# Patient Record
Sex: FEMALE | Race: WHITE | NOT HISPANIC OR LATINO | Employment: FULL TIME | URBAN - METROPOLITAN AREA
[De-identification: names, ages, dates, MRNs, and addresses within clinical notes are randomized per-mention and may not be internally consistent; named-entity substitution may affect disease eponyms.]

---

## 2017-09-14 ENCOUNTER — RECORDS - HEALTHEAST (OUTPATIENT)
Dept: GENERAL RADIOLOGY | Facility: CLINIC | Age: 37
End: 2017-09-14

## 2017-09-14 ENCOUNTER — OFFICE VISIT - HEALTHEAST (OUTPATIENT)
Dept: INTERNAL MEDICINE | Facility: CLINIC | Age: 37
End: 2017-09-14

## 2017-09-14 ENCOUNTER — AMBULATORY - HEALTHEAST (OUTPATIENT)
Dept: INTERNAL MEDICINE | Facility: CLINIC | Age: 37
End: 2017-09-14

## 2017-09-14 DIAGNOSIS — N39.0 UTI (URINARY TRACT INFECTION): ICD-10-CM

## 2017-09-14 DIAGNOSIS — R39.9 URINARY SYMPTOM OR SIGN: ICD-10-CM

## 2017-09-14 DIAGNOSIS — R30.0 DYSURIA: ICD-10-CM

## 2017-09-14 DIAGNOSIS — M41.9 SCOLIOSIS, UNSPECIFIED: ICD-10-CM

## 2017-09-14 DIAGNOSIS — M41.9 SCOLIOSIS: ICD-10-CM

## 2017-09-18 ENCOUNTER — COMMUNICATION - HEALTHEAST (OUTPATIENT)
Dept: INTERNAL MEDICINE | Facility: CLINIC | Age: 37
End: 2017-09-18

## 2017-09-19 ENCOUNTER — AMBULATORY - HEALTHEAST (OUTPATIENT)
Dept: NEUROSURGERY | Facility: CLINIC | Age: 37
End: 2017-09-19

## 2017-09-19 DIAGNOSIS — M41.9 SCOLIOSIS: ICD-10-CM

## 2017-09-21 ENCOUNTER — HOSPITAL ENCOUNTER (OUTPATIENT)
Dept: RADIOLOGY | Facility: CLINIC | Age: 37
Discharge: HOME | End: 2017-09-21
Attending: NEUROLOGICAL SURGERY

## 2017-09-21 DIAGNOSIS — M41.9 SCOLIOSIS: ICD-10-CM

## 2017-10-17 ENCOUNTER — OFFICE VISIT - HEALTHEAST (OUTPATIENT)
Dept: NEUROSURGERY | Facility: CLINIC | Age: 37
End: 2017-10-17

## 2017-10-17 DIAGNOSIS — M41.125 ADOLESCENT IDIOPATHIC SCOLIOSIS OF THORACOLUMBAR REGION: ICD-10-CM

## 2017-10-17 DIAGNOSIS — M43.25 FUSION OF SPINE OF THORACOLUMBAR REGION: ICD-10-CM

## 2018-07-12 ENCOUNTER — OFFICE VISIT - HEALTHEAST (OUTPATIENT)
Dept: INTERNAL MEDICINE | Facility: CLINIC | Age: 38
End: 2018-07-12

## 2018-07-12 DIAGNOSIS — B07.0 PLANTAR WARTS: ICD-10-CM

## 2019-10-28 ENCOUNTER — COMMUNICATION - HEALTHEAST (OUTPATIENT)
Dept: HEALTH INFORMATION MANAGEMENT | Facility: CLINIC | Age: 39
End: 2019-10-28

## 2020-02-12 ENCOUNTER — COMMUNICATION - HEALTHEAST (OUTPATIENT)
Dept: FAMILY MEDICINE | Facility: CLINIC | Age: 40
End: 2020-02-12

## 2020-02-12 ENCOUNTER — OFFICE VISIT - HEALTHEAST (OUTPATIENT)
Dept: FAMILY MEDICINE | Facility: CLINIC | Age: 40
End: 2020-02-12

## 2020-02-12 DIAGNOSIS — R00.0 TACHYCARDIA: ICD-10-CM

## 2020-02-12 LAB
ATRIAL RATE - MUSE: 91 BPM
DIASTOLIC BLOOD PRESSURE - MUSE: NORMAL
INTERPRETATION ECG - MUSE: NORMAL
P AXIS - MUSE: 34 DEGREES
PR INTERVAL - MUSE: 110 MS
QRS DURATION - MUSE: 84 MS
QT - MUSE: 350 MS
QTC - MUSE: 430 MS
R AXIS - MUSE: 38 DEGREES
SYSTOLIC BLOOD PRESSURE - MUSE: NORMAL
T AXIS - MUSE: 35 DEGREES
VENTRICULAR RATE- MUSE: 91 BPM

## 2020-02-16 ENCOUNTER — AMBULATORY - HEALTHEAST (OUTPATIENT)
Dept: FAMILY MEDICINE | Facility: CLINIC | Age: 40
End: 2020-02-16

## 2020-02-16 DIAGNOSIS — R00.0 TACHYCARDIA: ICD-10-CM

## 2021-04-28 ENCOUNTER — OFFICE VISIT - HEALTHEAST (OUTPATIENT)
Dept: INTERNAL MEDICINE | Facility: CLINIC | Age: 41
End: 2021-04-28

## 2021-04-28 DIAGNOSIS — R00.0 TACHYCARDIA: ICD-10-CM

## 2021-04-28 DIAGNOSIS — E04.9 THYROID ENLARGED: ICD-10-CM

## 2021-04-28 DIAGNOSIS — M43.25 FUSION OF SPINE OF THORACOLUMBAR REGION: ICD-10-CM

## 2021-04-28 DIAGNOSIS — Z00.00 HEALTHCARE MAINTENANCE: ICD-10-CM

## 2021-04-28 DIAGNOSIS — M41.125 ADOLESCENT IDIOPATHIC SCOLIOSIS OF THORACOLUMBAR REGION: ICD-10-CM

## 2021-04-28 LAB
ALBUMIN SERPL-MCNC: 4.8 G/DL (ref 3.5–5)
ALBUMIN UR-MCNC: NEGATIVE G/DL
ALP SERPL-CCNC: 82 U/L (ref 45–120)
ALT SERPL W P-5'-P-CCNC: 12 U/L (ref 0–45)
ANION GAP SERPL CALCULATED.3IONS-SCNC: 15 MMOL/L (ref 5–18)
APPEARANCE UR: CLEAR
AST SERPL W P-5'-P-CCNC: 18 U/L (ref 0–40)
BACTERIA #/AREA URNS HPF: ABNORMAL /[HPF]
BILIRUB SERPL-MCNC: 0.8 MG/DL (ref 0–1)
BILIRUB UR QL STRIP: NEGATIVE
BUN SERPL-MCNC: 6 MG/DL (ref 8–22)
CALCIUM SERPL-MCNC: 9.6 MG/DL (ref 8.5–10.5)
CHLORIDE BLD-SCNC: 103 MMOL/L (ref 98–107)
CHOLEST SERPL-MCNC: 195 MG/DL
CO2 SERPL-SCNC: 22 MMOL/L (ref 22–31)
COLOR UR AUTO: YELLOW
CREAT SERPL-MCNC: 0.71 MG/DL (ref 0.6–1.1)
ERYTHROCYTE [DISTWIDTH] IN BLOOD BY AUTOMATED COUNT: 13 % (ref 11–14.5)
FASTING STATUS PATIENT QL REPORTED: YES
GFR SERPL CREATININE-BSD FRML MDRD: >60 ML/MIN/1.73M2
GLUCOSE BLD-MCNC: 82 MG/DL (ref 70–125)
GLUCOSE UR STRIP-MCNC: NEGATIVE MG/DL
HCT VFR BLD AUTO: 41.7 % (ref 35–47)
HDLC SERPL-MCNC: 79 MG/DL
HGB BLD-MCNC: 13.7 G/DL (ref 12–16)
HGB UR QL STRIP: ABNORMAL
KETONES UR STRIP-MCNC: ABNORMAL MG/DL
LDLC SERPL CALC-MCNC: 106 MG/DL
LEUKOCYTE ESTERASE UR QL STRIP: ABNORMAL
MCH RBC QN AUTO: 28.8 PG (ref 27–34)
MCHC RBC AUTO-ENTMCNC: 32.9 G/DL (ref 32–36)
MCV RBC AUTO: 88 FL (ref 80–100)
NITRATE UR QL: NEGATIVE
PH UR STRIP: 5 [PH] (ref 5–8)
PLATELET # BLD AUTO: 346 THOU/UL (ref 140–440)
PMV BLD AUTO: 9.8 FL (ref 7–10)
POTASSIUM BLD-SCNC: 3.8 MMOL/L (ref 3.5–5)
PROT SERPL-MCNC: 8.1 G/DL (ref 6–8)
RBC # BLD AUTO: 4.75 MILL/UL (ref 3.8–5.4)
RBC #/AREA URNS AUTO: ABNORMAL HPF
SODIUM SERPL-SCNC: 140 MMOL/L (ref 136–145)
SP GR UR STRIP: <=1.005 (ref 1–1.03)
SQUAMOUS #/AREA URNS AUTO: ABNORMAL LPF
T4 FREE SERPL-MCNC: 1.2 NG/DL (ref 0.7–1.8)
TRIGL SERPL-MCNC: 51 MG/DL
TSH SERPL DL<=0.005 MIU/L-ACNC: 2.2 UIU/ML (ref 0.3–5)
UROBILINOGEN UR STRIP-ACNC: ABNORMAL
WBC #/AREA URNS AUTO: ABNORMAL HPF
WBC: 9.2 THOU/UL (ref 4–11)

## 2021-04-28 ASSESSMENT — MIFFLIN-ST. JEOR: SCORE: 1270.29

## 2021-04-29 LAB
25(OH)D3 SERPL-MCNC: 33.3 NG/ML (ref 30–80)
25(OH)D3 SERPL-MCNC: 33.3 NG/ML (ref 30–80)
THYROID PEROXIDASE ANTIBODIES - HISTORICAL: <3 IU/ML (ref 0–5.6)

## 2021-04-30 LAB — THYROGLOB AB SERPL-ACNC: <0.9 IU/ML (ref 0–4)

## 2021-05-31 VITALS — WEIGHT: 137 LBS

## 2021-06-01 VITALS — WEIGHT: 136.4 LBS

## 2021-06-04 ENCOUNTER — HOSPITAL ENCOUNTER (OUTPATIENT)
Dept: ULTRASOUND IMAGING | Facility: CLINIC | Age: 41
Discharge: HOME | End: 2021-06-04
Attending: INTERNAL MEDICINE

## 2021-06-04 ENCOUNTER — HOSPITAL ENCOUNTER (OUTPATIENT)
Dept: MAMMOGRAPHY | Facility: CLINIC | Age: 41
Discharge: HOME | End: 2021-06-04
Attending: INTERNAL MEDICINE

## 2021-06-04 VITALS
SYSTOLIC BLOOD PRESSURE: 123 MMHG | HEART RATE: 97 BPM | WEIGHT: 133.3 LBS | DIASTOLIC BLOOD PRESSURE: 82 MMHG | OXYGEN SATURATION: 100 %

## 2021-06-04 DIAGNOSIS — E04.9 THYROID ENLARGED: ICD-10-CM

## 2021-06-04 DIAGNOSIS — Z00.00 HEALTHCARE MAINTENANCE: ICD-10-CM

## 2021-06-05 VITALS
SYSTOLIC BLOOD PRESSURE: 136 MMHG | HEART RATE: 96 BPM | OXYGEN SATURATION: 100 % | HEIGHT: 67 IN | WEIGHT: 128 LBS | DIASTOLIC BLOOD PRESSURE: 80 MMHG | BODY MASS INDEX: 20.09 KG/M2

## 2021-06-06 NOTE — PROGRESS NOTES
ASSESSMENT:  1. Tachycardia  Patient with a history of what is likely to be paroxysmal supraventricular tachycardia, she has rare in frequent episodes with no obvious trigger.  EKG is completely normal.  - Electrocardiogram Perform and Read        PLAN:  1.  Results of the EKG discussed with the patient.  2.  Discussed various options including watchful waiting, for cardiology referral.  3.  Discussed medication so I think with the infrequent nature I do not think she would be a good candidate for a beta-blocker at this time.  4.  Patient also encouraged that she needs to follow-up with a primary.    Orders Placed This Encounter   Procedures     Electrocardiogram Perform and Read     There are no discontinued medications.    Return in about 3 months (around 5/12/2020) for to establish care with someone .    CHIEF COMPLAINT:  Chief Complaint   Patient presents with     Heart Problem     pt says she had some tachycardia yesterday        SUBJECTIVE:  Chloe is a 39 y.o. female who presents to the clinic for heart concerns. States she experienced some tachycardia yesterday. She was folding laundry when she had the tachycardic episode. She has experienced this before but explains it passes after a couple of minutes and is very infrequent. She would normally take 2 deep breaths and the episode would end. Yesterday, the episode lasted 30 minutes, staring around 6:30 PM and ended at 7:02 PM. Patient was able to feel the tachycardia and describes having trouble breathing during the episode. Her  checked her HR and confirmed tachycardia. She believes it was regular. The last time she experienced a tachycardic episode was around September. Chloe is unable to identify any triggers. She denied feeling presyncope or syncope.     Patient does not want to see a cardiologist. She wants to take notes about her episodes, to see if she notes any triggers or frequency in the episodes. She has agreed to     REVIEW OF SYSTEMS:   Please  see above.   All other systems are negative.    PFSH:  Non-smoker. Consumes alcohol once a week.   Immunization History   Administered Date(s) Administered     Influenza,live, Nasal Laiv4 11/18/2014, 11/18/2014     Influenza,seasonal,quad inj =/> 6months 11/08/2015     Social History     Socioeconomic History     Marital status:      Spouse name: Not on file     Number of children: Not on file     Years of education: Not on file     Highest education level: Not on file   Occupational History     Not on file   Social Needs     Financial resource strain: Not on file     Food insecurity:     Worry: Not on file     Inability: Not on file     Transportation needs:     Medical: Not on file     Non-medical: Not on file   Tobacco Use     Smoking status: Never Smoker     Smokeless tobacco: Never Used   Substance and Sexual Activity     Alcohol use: Yes     Drug use: Not on file     Sexual activity: Not on file   Lifestyle     Physical activity:     Days per week: Not on file     Minutes per session: Not on file     Stress: Not on file   Relationships     Social connections:     Talks on phone: Not on file     Gets together: Not on file     Attends Restoration service: Not on file     Active member of club or organization: Not on file     Attends meetings of clubs or organizations: Not on file     Relationship status: Not on file     Intimate partner violence:     Fear of current or ex partner: Not on file     Emotionally abused: Not on file     Physically abused: Not on file     Forced sexual activity: Not on file   Other Topics Concern     Not on file   Social History Narrative     Not on file     History reviewed. No pertinent past medical history.  Family History   Problem Relation Age of Onset     Cancer Mother      Colon cancer Maternal Uncle      Colon cancer Cousin      Hypertension Father        MEDICATIONS:  Current Outpatient Medications   Medication Sig Dispense Refill     cholecalciferol, vitamin D3,  (VITAMIN D3) 5,000 unit Tab Take by mouth.       No current facility-administered medications for this visit.        TOBACCO USE:  Social History     Tobacco Use   Smoking Status Never Smoker   Smokeless Tobacco Never Used       VITALS:  Vitals:    02/12/20 1306   BP: 123/82   Pulse: 97   SpO2: 100%   Weight: 133 lb 4.8 oz (60.5 kg)     Wt Readings from Last 3 Encounters:   02/12/20 133 lb 4.8 oz (60.5 kg)   07/12/18 136 lb 6.4 oz (61.9 kg)   09/14/17 137 lb (62.1 kg)       PHYSICAL EXAM:  Constitutional:   Reveals a healthy appearing female.  Vitals: per nursing notes.  HEENT:  Ears:  External canals, TMs clear.    Eyes:  EOMs full, PERRL.  Lungs: Clear to A&P without rales or wheezes.  Respiratory effort normal.  Cardiac:   90, regular rhythm, no murmur.  Musculoskeletal: No peripheral swelling.  Neuro:  Alert and oriented. Cranial nerves, motor, sensory exams are intact.  No gross focal deficits.  Psychiatric:  Memory intact, mood appropriate.      DATA REVIEWED:  Additional History from Old Records Summarized (2): None  Decision to Obtain Records (1): None  Radiology Tests Summarized or Ordered (1): None  Labs Reviewed or Ordered (1): None  Medicine Test Summarized or Ordered (1): EKG ordered.   Independent Review of EKG, X-RAY, or RAPID STREP (2 each): EKG 2/12/2020 preliminary interpretation: normal sinus rhythm.     The visit lasted a total of 21 minutes face to face with the patient. Over 50% of the time was spent counseling and educating the patient about heart concerns.    Winnie GARCIA am scribing for and in the presence of, Dr. Brayden Mccormack, personally performed the services described in this documentation, as scribed by Winnie Wharton in my presence, and it is both accurate and complete.      Total data points: 3

## 2021-06-06 NOTE — PATIENT INSTRUCTIONS - HE
I did comment on the EKG which is over read as normal.  Keep track of any symptoms and certainly follow-up as needed for this, you also should establish care with a primary.

## 2021-06-08 ENCOUNTER — COMMUNICATION - HEALTHEAST (OUTPATIENT)
Dept: INTERNAL MEDICINE | Facility: CLINIC | Age: 41
End: 2021-06-08

## 2021-06-13 NOTE — PROGRESS NOTES
ASSESSMENT and PLAN:  1. Urinary symptom or sign  UA obtained and c/w infection.    - Urinalysis-UC if Indicated  - Culture, Urine    2. Scoliosis  We'll get her xrays today.  She's going to est w/ NS w/in HE.  - XR Thoracic Spine 2 VWS; Future  - XR Lumbar Spine 2 or 3 VWS; Future  - Ambulatory referral to Neurosurgery    3. UTI (urinary tract infection)  Her UA and sx are c/w UTI.  We'll start macrobid and f/u on the cx and adjust abx if needed.  - nitrofurantoin, macrocrystal-monohydrate, (MACROBID) 100 MG capsule; Take 1 capsule (100 mg total) by mouth 2 (two) times a day for 7 days.  Dispense: 14 capsule; Refill: 0        There are no discontinued medications.    No Follow-up on file.    There are no Patient Instructions on file for this visit.    CHIEF COMPLAINT:  Chief Complaint   Patient presents with     Urinary Tract Infection     pt states frequent urination/ burning sensation when urinate/ cloudy color x 4days        HISTORY OF PRESENT ILLNESS:  Chloe Shelley is a 36 y.o. female  presenting to the clinic today for a possible UTI.  She's had 4 days of symptoms.  She was on call last week and was likely dehydrated.  She also tried a menstraul for the first time.  She doesn't know if that contributed or not.  She hasn't had any hematuria.  No f/c, no n/v.  She has burning w/ urination and hesitancy.    She also wants to f/u on her scoliosis.  We discussed getting est w/ someone in the  area.  She had surgery in childhood and doesn't have all of her records.  We discussed getting baseline xrays at our last visit, but she hasn't had time to do those.  She'd like to get them today since she's here and has some time.    PFSH:  She's working as a pediatric endocrinologist.  Her  is a hospitalist at       TOBACCO USE:  History   Smoking Status     Never Smoker   Smokeless Tobacco     Never Used       VITALS:  Vitals:    09/14/17 1139   BP: 108/62   Patient Site: Right Arm   Patient Position: Sitting    Cuff Size: Adult Regular   Pulse: 76   Weight: 137 lb (62.1 kg)     Wt Readings from Last 3 Encounters:   09/14/17 137 lb (62.1 kg)   08/04/15 135 lb 3.2 oz (61.3 kg)         PHYSICAL EXAM:  Constitutional:  Reveals an alert, pleasant adult female.   Vitals:  Noted.   Back: no CVA tendernss   Abdomen: Soft, supra pubic tenderness       MEDICATIONS:  Current Outpatient Prescriptions   Medication Sig Dispense Refill     cholecalciferol, vitamin D3, (VITAMIN D3) 5,000 unit Tab Take by mouth.       No current facility-administered medications for this visit.

## 2021-06-13 NOTE — PROGRESS NOTES
A consult was placed to Dr. Schultz.  The reason for the consult was back pain.  The following XR were ordered to assess for alignment: scoliosis.  Tayla Nava RN, CNRN

## 2021-06-13 NOTE — PROGRESS NOTES
Assessment/Plan:            Diagnoses and all orders for this visit:    Adolescent idiopathic scoliosis of thoracolumbar region    Fusion of spine of thoracolumbar region      It was a pleasure to evaluate Dr. Chloe Shelley at the kind referral of Dr. Zunilda Gill to establish scoliosis care.    Axel Karsten has good sagittal and coronal alignment and a technically-excellent T3-L4 posterior fusion construct performed almost 20 years ago. She has a normal neurologic exam. I do not see any indication for further imaging at this time. I informed her of potential genetic risk of scoliosis as she has two children, and she is aware of this. I explained adjacent segment degeneration and new pain, at which point I asked her to return to see me for further imaging. Routine surveillance imaging in the absence of new symptoms is not needed.  I discussed with her to have a neutral computer screen at eye-level to avoid cervicothoracic kyphotic bending.     I am happy to see her at any time for further evaluation should new symptoms arise. She understands that I will be transitioning my practice to the [facility] Department of Neurosurgery, and she will contact me there if any further needs arise.      I spent 30 minutes in patient care with greater than 50% spent in counseling and/or coordination of care.      Subjective:    Patient ID: Chloe Shelley is a 36 y.o. female.    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 She is unable to remember her surgeon's name. She has no pain or numbness or weakness in arms or legs. No significant back or neck pain.    EMANUEL is 10% today      IMAGING:  Scoliosis xrays:  T3-L4 hook/pedicle screw posterior instrumented fusion, no instrumentation haloing  Right T3-T10 40 degree curve, left T11-L4 34 degree curve  CSVL 1.6cm to right  C7 SVA 3.6cm posterior to  sacrum (negative); WNL      Review of Systems   Constitutional: Negative for activity change, appetite change, chills, fatigue, fever and unexpected weight change.   HENT: Negative for dental problem, mouth sores and trouble swallowing.    Eyes: Negative for visual disturbance.   Respiratory: Negative for shortness of breath.    Cardiovascular: Negative for leg swelling.   Gastrointestinal: Negative for abdominal pain, constipation, diarrhea, nausea and vomiting.   Endocrine: Negative for cold intolerance.   Genitourinary: Negative for difficulty urinating, dysuria, enuresis, frequency and urgency.   Musculoskeletal: Positive for arthralgias and back pain. Negative for gait problem, neck pain and neck stiffness.   Skin: Negative for color change.   Allergic/Immunologic: Positive for environmental allergies. Negative for immunocompromised state.   Neurological: Positive for numbness and headaches. Negative for weakness.   Hematological: Does not bruise/bleed easily.   Psychiatric/Behavioral: The patient is not nervous/anxious.      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 She appears well-developed and well-nourished. She is cooperative. No distress.   HENT:   Head: Normocephalic and atraumatic.   Eyes: Conjunctivae are normal.   Neck: Normal range of motion. Neck supple. No spinous process tenderness and no muscular tenderness present. No tracheal deviation present.   Cardiovascular: Normal rate and regular rhythm.    Pulmonary/Chest: Effort normal and breath sounds normal.   Abdominal: Soft. Bowel sounds are normal. She exhibits no distension. There is no  tenderness.   Musculoskeletal:   Cervical flexion/extension ROM: normal range  Lumbar flexion/extension ROM: limited due to prior thoracolumbar fusion   Neurological: She is alert and oriented to person, place, and time. She has normal strength. No cranial nerve deficit or sensory deficit. Gait normal.   Reflex Scores:       Tricep reflexes are 2+ on the right side and 2+ on the left side.       Bicep reflexes are 2+ on the right side and 2+ on the left side.       Brachioradialis reflexes are 2+ on the right side and 2+ on the left side.       Patellar reflexes are 3+ on the right side and 3+ on the left side.       Achilles reflexes are 2+ on the right side and 2+ on the left side.  Strength:                                                LEFT      RIGHT  Deltoid                                     5            5  Bicep                                       5            5  Wrist Extensor                        5            5   Tricep                                      5            5  Finger Flexion                         5             5  Finger Abduction                     5            5                                            5           5    Hip Flexion                               5            5   Knee Extension                       5             5  Dorsiflexion                              5             5  Extensor Hallucis Longus        5            5  Plantar Flexion                         5             5    No Lhermitte's, No Spurling's  No Misha's   No ankle clonus  Able to tandem walk       Skin: Skin is warm, dry and intact.   Psychiatric: She has a normal mood and affect. Her speech is normal and behavior is normal.

## 2021-06-13 NOTE — PROGRESS NOTES
Neurosurgery consultation was requested by: Dr. Zunilda Gill   Pain: Right lower back pain   Radicular Pain is present: Pain on left side of neck /shoulder   Lhermitte sign:No  Motor complaints: Denies weakness  Sensory complaints: Had noticed once one time her left arm was numb  Gait and balance issues: Denies   Bowel or bladder issues: Denies   Duration of SX is: Many years   The symptoms are worse with:Sitting for long period of time or on feet all day   The symptoms are better with: Laying down   Injury: Scoliosis  Severity is: Mild - moderate  Patient has tried the following conservative measures: None   EMANUEL score is: 10%  WILMER Carrion

## 2021-06-16 ENCOUNTER — HOSPITAL ENCOUNTER (OUTPATIENT)
Dept: MAMMOGRAPHY | Facility: CLINIC | Age: 41
Discharge: HOME | End: 2021-06-16
Attending: INTERNAL MEDICINE
Payer: COMMERCIAL

## 2021-06-16 DIAGNOSIS — N64.89 BREAST ASYMMETRY: ICD-10-CM

## 2021-06-16 PROBLEM — R00.0 TACHYCARDIA: Status: ACTIVE | Noted: 2020-02-12

## 2021-06-16 PROBLEM — M41.125 ADOLESCENT IDIOPATHIC SCOLIOSIS OF THORACOLUMBAR REGION: Status: ACTIVE | Noted: 2017-10-18

## 2021-06-16 PROBLEM — M43.25 FUSION OF SPINE OF THORACOLUMBAR REGION: Status: ACTIVE | Noted: 2017-10-18

## 2021-06-17 NOTE — PROGRESS NOTES
Assessment:     Healthy female exam. Due for PAP and mammogram.  All preventive exams are up-to-date.  Fasting labs will be done today. Thyroid labs and US thyroid will be done.      This note has been dictated using voice recognition software. Any grammatical or context distortions are unintentional and inherent to the software    1. Healthcare maintenance  HM2(CBC w/o Differential)    Lipid Profile    Vitamin D, Total (25-Hydroxy)    Comprehensive Metabolic Panel    Urinalysis    Mammo Screening Bilateral   2. Thyroid enlarged  Thyroid Peroxidase Antibody    Thyroglobulin Antibody    Thyroid Stimulating Hormone (TSH)    T4, Free    US Thyroid   3. Adolescent idiopathic scoliosis of thoracolumbar region     4. Fusion of spine of thoracolumbar region     5. Tachycardia           Patient Instructions   PAP and visit with dermatologist to schedule.  To schedule mammogram.        Plan:          Return in about 1 year (around 4/28/2022) for Annual physical.    Subjective:       Chief Complaint   Patient presents with     Annual Exam     thyroid kyung Shelley is a 40 y.o. female who presents for an annual exam.   She noticed enlarged thyroid over the last few months. Denies any symptoms of hyper or hypothyroid. Has h/o sinus tachycardia and possible SVT in the past. Her father had thyroid nodule.    Healthy Habits:   Regular Exercise: Yes  Sunscreen Use: Yes  Healthy Diet: Yes  Dental Visits Regularly: Yes  Seat Belt: Yes  Immunization status: up to date and documented.    Health Maintenance   Topic Date Due     HEPATITIS C SCREENING  Never done     PREVENTIVE CARE VISIT  Never done     HIV SCREENING  Never done     COVID-19 Vaccine (1) Never done     TD 18+ HE  Never done     TDAP ADULT ONE TIME DOSE  Never done     ADVANCE CARE PLANNING  Never done     PAP SMEAR  Never done     INFLUENZA VACCINE RULE BASED (1) 08/01/2020     Pneumococcal Vaccine: Pediatrics (0 to 5 Years) and At-Risk Patients (6 to 64  Years)  Aged Out     HEPATITIS B VACCINES  Aged Out       Social History     Socioeconomic History     Marital status:      Spouse name: Not on file     Number of children: Not on file     Years of education: Not on file     Highest education level: Not on file   Occupational History     Occupation: Pediatric Endocrinologist   Social Needs     Financial resource strain: Not on file     Food insecurity     Worry: Not on file     Inability: Not on file     Transportation needs     Medical: Not on file     Non-medical: Not on file   Tobacco Use     Smoking status: Never Smoker     Smokeless tobacco: Never Used   Substance and Sexual Activity     Alcohol use: Yes     Drug use: Not on file     Sexual activity: Not on file   Lifestyle     Physical activity     Days per week: Not on file     Minutes per session: Not on file     Stress: Not on file   Relationships     Social connections     Talks on phone: Not on file     Gets together: Not on file     Attends Restorationist service: Not on file     Active member of club or organization: Not on file     Attends meetings of clubs or organizations: Not on file     Relationship status: Not on file     Intimate partner violence     Fear of current or ex partner: Not on file     Emotionally abused: Not on file     Physically abused: Not on file     Forced sexual activity: Not on file   Other Topics Concern     Not on file   Social History Narrative     Not on file       Family History   Problem Relation Age of Onset     Cancer Mother      Colon cancer Maternal Uncle      Colon cancer Cousin      Hypertension Father        Patient Active Problem List   Diagnosis     Adolescent idiopathic scoliosis of thoracolumbar region     Fusion of spine of thoracolumbar region     Tachycardia       Current Outpatient Medications on File Prior to Visit   Medication Sig Dispense Refill     cholecalciferol, vitamin D3, (VITAMIN D3) 5,000 unit Tab Take by mouth.       fluticasone propionate  "(FLONASE) 50 mcg/actuation nasal spray Apply 1 spray into each nostril daily.       No current facility-administered medications on file prior to visit.        Review of Systems  A 12 point comprehensive review of systems was negative except as noted in HPI.         Objective:      /80   Pulse 96   Ht 5' 6.5\" (1.689 m)   Wt 128 lb (58.1 kg)   SpO2 100%   BMI 20.35 kg/m      Body mass index is 20.35 kg/m .        Physical Exam:  General Appearance: Alert, cooperative, no distress, appears stated age.  Head: Normocephalic, without obvious abnormality, atraumatic  Eyes: PERRL, conjunctiva/corneas clear, EOM's intact  Ears: Normal TM's and external ear canals, both ears  Nose: Nares normal, septum midline,mucosa normal, no drainage  Throat: Lips, mucosa, and tongue normal; teeth and gums normal  Neck: Supple, symmetrical, trachea midline, no adenopathy;  thyroid: is enlarged, symmetric, no tenderness/mass/nodules; no carotid bruit or JVD  Back: Symmetric, no curvature, ROM normal, no CVA tenderness.  Lungs: Clear to auscultation bilaterally, respirations unlabored.  Breasts: No breast masses, tenderness, asymmetry, or nipple discharge.  Heart: Regular rate and rhythm, S1 and S2 normal, no murmur, rub, or gallop.  Abdomen: Soft, non-tender, bowel sounds active all four quadrants,  no masses, no organomegaly.  Pelvic:Not done, has her period.  Extremities: Extremities normal, atraumatic, no cyanosis or edema.  Skin: Skin color, texture, turgor normal, no rashes or lesions.  Lymph nodes: Cervical, supraclavicular, and axillary nodes normal.  Neurologic: No focal neurological findings.           "

## 2021-06-19 NOTE — LETTER
Letter by Lselie Mirza at      Author: Leslie Mirza Service: -- Author Type: --    Filed:  Encounter Date: 10/28/2019 Status: Signed         2019       Chloe Alonzoyuejane  [address]  [place] [address]    Dear Chloe LEON Karsten:    We are pleased to provide you with secure, online access to medical information for you and your family. Per your request, we have expanded your account to allow access to the records of the following family members:                Naseem LEON Karsten (privilege ends on 2024.)     How Do I Login?  1. In your Internet browser, go to [email or web address].  2. Click on Sign Up Now   3. Enter your [employer] Activation Code exactly as it appears below. This code will  60 days after it is generated. You will not need to use this code after you have completed the sign-up process. If you do not sign up before the expiration date, you must request a new code.     [employer] Activation Code: [email or web address]  Expires: 2019 12:40 PM    4. Enter in your date of birth and zip code.  5. Create a [employer] username. Think of one that is secure and easy to remember.  Your username must be between 6 and 20 characters.  6. Create a [employer] password. You can change your password at any time. Your password must be between 8 and 45 characters, contain at least two letters and one number, and contain both upper and lower case letters.  7. Choose a security question, enter your answer, and click Next. This can be used to access [employer] if you forget your password.   8. Enter a valid e-mail address to receive e-mail notifications when new information is available in [employer].  9. Click Sign In.        How Do I Access a Family Member's Account?  10. Select the account you want to access by clicking the [place] with the appropriate patient's name at the top of your screen.   11. You will see a disclaimer page letting you know that you will be viewing a  family member's record. Review the disclaimer and then click Accept Proxy Access Disclaimer to proceed.  12. Once you switch to viewing a family member's record, you can navigate to [employer] pages the same way you would for yourself. You can return to your own account by clicking the [place] at the top of the screen with your name on it.    13. To customize colors and names of the linked accounts, you can select Personalize from the Profile dropdown menu at the top of the screen, then click the Edit button to make changes.      Additional Information  If you have questions, you can e-mail [email or web address] or call [phone] to talk to our [facility] staff. Remember, [employer] is NOT to be used for urgent needs. For medical emergencies, dial 911.

## 2021-06-19 NOTE — LETTER
Letter by Leslie Mirza at      Author: Leslie Mirza Service: -- Author Type: --    Filed:  Encounter Date: 10/28/2019 Status: Signed         2019       Chloe LEON Karsten  [address]  [place] [address]    Dear Chloeconstanza Shelley:    We are pleased to provide you with secure, online access to medical information for you and your family. Per your request, we have expanded your account to allow access to the records of the following family members:              Genoveva MCGEE Karsten (privilege ends on 10/30/2021.)     How Do I Login?  1. In your Internet browser, go to [email or web address].  2. Click on Sign Up Now   3. Enter your [employer] Activation Code exactly as it appears below. This code will  60 days after it is generated. You will not need to use this code after you have completed the sign-up process. If you do not sign up before the expiration date, you must request a new code.     [employer] Activation Code: [email or web address]  Expires: 2019 12:40 PM    4. Enter in your date of birth and zip code.  5. Create a [employer] username. Think of one that is secure and easy to remember.  Your username must be between 6 and 20 characters.  6. Create a [employer] password. You can change your password at any time. Your password must be between 8 and 45 characters, contain at least two letters and one number, and contain both upper and lower case letters.  7. Choose a security question, enter your answer, and click Next. This can be used to access [employer] if you forget your password.   8. Enter a valid e-mail address to receive e-mail notifications when new information is available in [employer].  9. Click Sign In.        How Do I Access a Family Member's Account?  10. Select the account you want to access by clicking the [place] with the appropriate patient's name at the top of your screen.   11. You will see a disclaimer page letting you know that you will be viewing a  family member's record. Review the disclaimer and then click Accept Proxy Access Disclaimer to proceed.  12. Once you switch to viewing a family member's record, you can navigate to [employer] pages the same way you would for yourself. You can return to your own account by clicking the [place] at the top of the screen with your name on it.    13. To customize colors and names of the linked accounts, you can select Personalize from the Profile dropdown menu at the top of the screen, then click the Edit button to make changes.      Additional Information  If you have questions, you can e-mail [email or web address] or call [phone] to talk to our [facility] [employer] staff. Remember, [employer] is NOT to be used for urgent needs. For medical emergencies, dial 911.

## 2021-06-19 NOTE — PROGRESS NOTES
1. Plantar warts        Plan: This wart was shaved down with a 15 blade scalpel, and then treated with cryotherapy freeze thaw manner ×5.  She tolerated this very well and there were no complications.  She was instructed to cover with a Band-Aid as needed.  She will return in 2-3 weeks for retreatment if necessary.    Subjective: 37-year-old female here today for a lump on the bottom of her foot.  She believes that it is a wart.  She thinks that she got up from her kids, as her 8 and 5-year-old both have them.  She has noticed this for a couple of months, but has been very busy with work to be able to come in.  She has not used any over-the-counter treatments for her.  She has not had a lot of problems with warts in the past.    Objective: Well-appearing female in no acute distress.  Vital signs as noted.  She has quite a bit of callus buildup around this work.  Once it was shaved down the wart is not all that big and was treated as mentioned above.

## 2021-06-20 NOTE — LETTER
Letter by Mani Owen MD at      Author: Mani Owen MD Service: -- Author Type: --    Filed:  Encounter Date: 2/12/2020 Status: (Other)         Chloe LEON Karsten  [address] [place] [address]             February 12, 2020         Dear Ms. Beckerkt,      Below are the results from your recent visit: The official cardiology interpretation is normal sinus rhythm no evidence of any Geraldine-Parkinson-White or any other rhythm abnormality.    Resulted Orders   Electrocardiogram Perform and Read   Result Value Ref Range    SYSTOLIC BLOOD PRESSURE      DIASTOLIC BLOOD PRESSURE      VENTRICULAR RATE 91 BPM    ATRIAL RATE 91 BPM    P-R INTERVAL 110 ms    QRS DURATION 84 ms    Q-T INTERVAL 350 ms    QTC CALCULATION (BEZET) 430 ms    P Axis 34 degrees    R AXIS 38 degrees    T AXIS 35 degrees    MUSE DIAGNOSIS       Sinus rhythm  Normal ECG  No previous ECGs available  Confirmed by JEVON SOLIS MD LOC: ([id number]) on 2/12/2020 2:21:55 PM              Please call with questions or contact us using [employer].    Sincerely,        Electronically signed by Mani Oewn MD

## 2021-06-25 NOTE — TELEPHONE ENCOUNTER
----- Message from Stephanie Romero MD sent at 6/7/2021  3:24 PM CDT -----  Please call Radiology and Dr Cloud. Ask him to confirm if nodule 2 is 7x7x6 cm or mm. And to correct the report.

## 2021-06-25 NOTE — TELEPHONE ENCOUNTER
I called and the report is incorrect. Nodule 2 should be mm. The radiologist will make an addendum to the report and should be available in a few minutes.

## 2021-06-27 ENCOUNTER — HEALTH MAINTENANCE LETTER (OUTPATIENT)
Age: 41
End: 2021-06-27

## 2021-10-17 ENCOUNTER — HEALTH MAINTENANCE LETTER (OUTPATIENT)
Age: 41
End: 2021-10-17

## 2021-12-31 ENCOUNTER — IMMUNIZATION (OUTPATIENT)
Dept: NURSING | Facility: CLINIC | Age: 41
End: 2021-12-31
Payer: COMMERCIAL

## 2021-12-31 PROCEDURE — 91300 PR COVID VAC PFIZER DIL RECON 30 MCG/0.3 ML IM: CPT

## 2021-12-31 PROCEDURE — 0004A PR COVID VAC PFIZER DIL RECON 30 MCG/0.3 ML IM: CPT

## 2022-05-28 ENCOUNTER — HEALTH MAINTENANCE LETTER (OUTPATIENT)
Age: 42
End: 2022-05-28

## 2022-07-25 ASSESSMENT — ENCOUNTER SYMPTOMS
BREAST MASS: 0
HEADACHES: 1

## 2022-07-27 ENCOUNTER — OFFICE VISIT (OUTPATIENT)
Dept: INTERNAL MEDICINE | Facility: CLINIC | Age: 42
End: 2022-07-27
Payer: COMMERCIAL

## 2022-07-27 VITALS
BODY MASS INDEX: 20.73 KG/M2 | SYSTOLIC BLOOD PRESSURE: 120 MMHG | WEIGHT: 132.06 LBS | HEART RATE: 94 BPM | DIASTOLIC BLOOD PRESSURE: 74 MMHG | HEIGHT: 67 IN

## 2022-07-27 DIAGNOSIS — Z12.31 VISIT FOR SCREENING MAMMOGRAM: ICD-10-CM

## 2022-07-27 DIAGNOSIS — E04.1 THYROID NODULE: ICD-10-CM

## 2022-07-27 DIAGNOSIS — Z11.4 SCREENING FOR HIV (HUMAN IMMUNODEFICIENCY VIRUS): ICD-10-CM

## 2022-07-27 DIAGNOSIS — Z00.00 ANNUAL PHYSICAL EXAM: Primary | ICD-10-CM

## 2022-07-27 DIAGNOSIS — Z11.59 NEED FOR HEPATITIS C SCREENING TEST: ICD-10-CM

## 2022-07-27 LAB
ALBUMIN SERPL BCG-MCNC: 4.7 G/DL (ref 3.5–5.2)
ALP SERPL-CCNC: 71 U/L (ref 35–104)
ALT SERPL W P-5'-P-CCNC: 7 U/L (ref 10–35)
ANION GAP SERPL CALCULATED.3IONS-SCNC: 12 MMOL/L (ref 7–15)
AST SERPL W P-5'-P-CCNC: 26 U/L (ref 10–35)
BILIRUB SERPL-MCNC: 0.6 MG/DL
BUN SERPL-MCNC: 13 MG/DL (ref 6–20)
CALCIUM SERPL-MCNC: 9.3 MG/DL (ref 8.6–10)
CHLORIDE SERPL-SCNC: 102 MMOL/L (ref 98–107)
CREAT SERPL-MCNC: 0.76 MG/DL (ref 0.51–0.95)
DEPRECATED HCO3 PLAS-SCNC: 23 MMOL/L (ref 22–29)
ERYTHROCYTE [DISTWIDTH] IN BLOOD BY AUTOMATED COUNT: 12.9 % (ref 10–15)
GFR SERPL CREATININE-BSD FRML MDRD: >90 ML/MIN/1.73M2
GLUCOSE SERPL-MCNC: 89 MG/DL (ref 70–99)
HCT VFR BLD AUTO: 40.8 % (ref 35–47)
HGB BLD-MCNC: 13.6 G/DL (ref 11.7–15.7)
MCH RBC QN AUTO: 29.6 PG (ref 26.5–33)
MCHC RBC AUTO-ENTMCNC: 33.3 G/DL (ref 31.5–36.5)
MCV RBC AUTO: 89 FL (ref 78–100)
PLATELET # BLD AUTO: 332 10E3/UL (ref 150–450)
POTASSIUM SERPL-SCNC: 4.3 MMOL/L (ref 3.4–5.3)
PROT SERPL-MCNC: 8 G/DL (ref 6.4–8.3)
RBC # BLD AUTO: 4.59 10E6/UL (ref 3.8–5.2)
SODIUM SERPL-SCNC: 137 MMOL/L (ref 136–145)
WBC # BLD AUTO: 9.1 10E3/UL (ref 4–11)

## 2022-07-27 PROCEDURE — 80053 COMPREHEN METABOLIC PANEL: CPT | Performed by: INTERNAL MEDICINE

## 2022-07-27 PROCEDURE — 99396 PREV VISIT EST AGE 40-64: CPT | Performed by: INTERNAL MEDICINE

## 2022-07-27 PROCEDURE — 87389 HIV-1 AG W/HIV-1&-2 AB AG IA: CPT | Performed by: INTERNAL MEDICINE

## 2022-07-27 PROCEDURE — 36415 COLL VENOUS BLD VENIPUNCTURE: CPT | Performed by: INTERNAL MEDICINE

## 2022-07-27 PROCEDURE — 86803 HEPATITIS C AB TEST: CPT | Performed by: INTERNAL MEDICINE

## 2022-07-27 PROCEDURE — 85027 COMPLETE CBC AUTOMATED: CPT | Performed by: INTERNAL MEDICINE

## 2022-07-27 NOTE — PROGRESS NOTES
SUBJECTIVE:   CC: Chloe Shelley is an 41 year old woman who presents for preventive health visit.       Patient has been advised of split billing requirements and indicates understanding: Yes  Healthy Habits:     Getting at least 3 servings of Calcium per day:  Yes    Bi-annual eye exam:  Yes    Dental care twice a year:  Yes    Sleep apnea or symptoms of sleep apnea:  None    Diet:  Regular (no restrictions)    Frequency of exercise:  4-5 days/week    Duration of exercise:  30-45 minutes    Taking medications regularly:  Yes    Medication side effects:  Not applicable    PHQ-2 Total Score: 0    Additional concerns today:  No              Today's PHQ-2 Score:   PHQ-2 ( 1999 [employer]) 7/25/2022   Q1: Little interest or pleasure in doing things 0   Q2: Feeling down, depressed or hopeless 0   PHQ-2 Score 0   Q1: Little interest or pleasure in doing things Not at all   Q2: Feeling down, depressed or hopeless Not at all   PHQ-2 Score 0       Abuse: Current or Past (Physical, Sexual or Emotional) - No  Do you feel safe in your environment? Yes    Have you ever done Advance Care Planning? (For example, a Health Directive, POLST, or a discussion with a medical provider or your loved ones about your wishes): No, advance care planning information given to patient to review.  Patient declined advance care planning discussion at this time.    Social History     Tobacco Use     Smoking status: Never Smoker     Smokeless tobacco: Never Used   Substance Use Topics     Alcohol use: Yes     If you drink alcohol do you typically have >3 drinks per day or >7 drinks per week? Yes    No flowsheet data found.  Reviewed orders with patient.  Reviewed health maintenance and updated orders accordingly -       Breast Cancer Screening:    FHS-7:   Breast CA Risk Assessment (FHS-7) 7/25/2022   Did any of your first-degree relatives have breast or ovarian cancer? No   Did any of your relatives have bilateral breast cancer? No   Did any man  "in your family have breast cancer? No   Did any woman in your family have breast and ovarian cancer? No   Did any woman in your family have breast cancer before age 50 y? No   Do you have 2 or more relatives with breast and/or ovarian cancer? No   Do you have 2 or more relatives with breast and/or bowel cancer? Yes         Pertinent mammograms are reviewed under the imaging tab.    History of abnormal Pap smear:      Reviewed and updated as needed this visit by clinical staff   Tobacco  Allergies  Meds                Reviewed and updated as needed this visit by Provider                       CONSTITUTIONAL: NEGATIVE for fever, chills, change in weight  INTEGUMENTARU/SKIN: NEGATIVE for worrisome rashes, moles or lesions  EYES: NEGATIVE for vision changes or irritation  ENT: NEGATIVE for ear, mouth and throat problems  RESP: NEGATIVE for significant cough or SOB  BREAST: NEGATIVE for masses, tenderness or discharge  CV: NEGATIVE for chest pain, palpitations or peripheral edema  GI: NEGATIVE for nausea, abdominal pain, heartburn, or change in bowel habits  : NEGATIVE for unusual urinary or vaginal symptoms. Periods are regular.  MUSCULOSKELETAL: NEGATIVE for significant arthralgias or myalgia  NEURO: NEGATIVE for weakness, dizziness or paresthesias  PSYCHIATRIC: NEGATIVE for changes in mood or affect     OBJECTIVE:   /74   Pulse 94   Ht 1.689 m (5' 6.5\")   Wt 59.9 kg (132 lb 1 oz)   BMI 21.00 kg/m    Physical Exam  General Appearance: Alert, cooperative, no distress, appears stated age.  Head: Normocephalic, without obvious abnormality, atraumatic  Eyes: PERRL, conjunctiva/corneas clear, EOM's intact  Ears: Normal TM's and external ear canals, both ears  Nose: Nares normal, septum midline,mucosa normal, no drainage  Throat: Lips, mucosa, and tongue normal; teeth and gums normal  Neck: Supple, symmetrical, trachea midline, no adenopathy;  thyroid: not enlarged, symmetric, no tenderness/mass/nodules; no " "carotid bruit or JVD  Back: Symmetric, no curvature, ROM normal, no CVA tenderness.  Lungs: Clear to auscultation bilaterally, respirations unlabored.  Breasts: No breast masses, tenderness, asymmetry, or nipple discharge.  Heart: Regular rate and rhythm, S1 and S2 normal, no murmur, rub, or gallop.  Abdomen: Soft, non-tender, bowel sounds active all four quadrants,  no masses, no organomegaly.  Extremities: Extremities normal, atraumatic, no cyanosis or edema.  Skin: Skin color, texture, turgor normal, no rashes or lesions.  Lymph nodes: Cervical, supraclavicular, and axillary nodes normal.  Neurologic: No focal neurological findings.          ASSESSMENT/PLAN:   (Z00.00) Annual physical exam  (primary encounter diagnosis)  Seeing GYN for PAP.    (Z11.4) Screening for HIV (human immunodeficiency virus)  Comment:   Plan: HIV Antigen Antibody Combo            (Z11.59) Need for hepatitis C screening test  Comment:   Plan: Hepatitis C Screen Reflex to HCV RNA Quant and         Genotype            (E04.1) Thyroid nodule  Comment: US done last year, needs follow-up this year.  Plan: US Thyroid, CBC with platelets, Comprehensive         metabolic panel            (Z12.31) Visit for screening mammogram  Comment:   Plan: MA Screen Bilateral w/Tung              Patient has been advised of split billing requirements and indicates understanding: Yes    COUNSELING:  Reviewed preventive health counseling, as reflected in patient instructions       Regular exercise       Healthy diet/nutrition    Estimated body mass index is 21 kg/m  as calculated from the following:    Height as of this encounter: 1.689 m (5' 6.5\").    Weight as of this encounter: 59.9 kg (132 lb 1 oz).        She reports that she has never smoked. She has never used smokeless tobacco.      Counseling Resources:  ATP IV Guidelines  Pooled Cohorts Equation Calculator  Breast Cancer Risk Calculator  BRCA-Related Cancer Risk Assessment: FHS-7 Tool  FRAX Risk " Assessment  ICSI Preventive Guidelines  Dietary Guidelines for Americans, 2010  [employer] MyPlate  ASA Prophylaxis  Lung CA Screening    Stephanie Romero MD  [facility]  Answers for HPI/ROS submitted by the patient on 7/25/2022  headaches: Yes  pelvic pain: No  vaginal bleeding: No  vaginal discharge: No  tenderness: No  breast mass: No  breast discharge: No

## 2022-07-28 LAB
HCV AB SERPL QL IA: NONREACTIVE
HIV 1+2 AB+HIV1 P24 AG SERPL QL IA: NONREACTIVE

## 2022-09-21 ENCOUNTER — HOSPITAL ENCOUNTER (OUTPATIENT)
Dept: ULTRASOUND IMAGING | Facility: CLINIC | Age: 42
Discharge: HOME | End: 2022-09-21
Attending: INTERNAL MEDICINE | Admitting: INTERNAL MEDICINE
Payer: COMMERCIAL

## 2022-09-21 DIAGNOSIS — E04.1 THYROID NODULE: ICD-10-CM

## 2022-09-21 PROCEDURE — 76536 US EXAM OF HEAD AND NECK: CPT

## 2022-09-28 ENCOUNTER — ANCILLARY PROCEDURE (OUTPATIENT)
Dept: MAMMOGRAPHY | Facility: CLINIC | Age: 42
End: 2022-09-28
Attending: INTERNAL MEDICINE
Payer: COMMERCIAL

## 2022-09-28 DIAGNOSIS — Z12.31 VISIT FOR SCREENING MAMMOGRAM: ICD-10-CM

## 2022-09-28 PROCEDURE — 77067 SCR MAMMO BI INCL CAD: CPT

## 2022-10-01 ENCOUNTER — HEALTH MAINTENANCE LETTER (OUTPATIENT)
Age: 42
End: 2022-10-01

## 2023-10-04 ENCOUNTER — ANCILLARY PROCEDURE (OUTPATIENT)
Dept: MAMMOGRAPHY | Facility: CLINIC | Age: 43
End: 2023-10-04
Attending: INTERNAL MEDICINE
Payer: COMMERCIAL

## 2023-10-04 DIAGNOSIS — Z12.31 SCREENING MAMMOGRAM, ENCOUNTER FOR: ICD-10-CM

## 2023-10-04 PROCEDURE — 77067 SCR MAMMO BI INCL CAD: CPT

## 2023-10-15 ENCOUNTER — HEALTH MAINTENANCE LETTER (OUTPATIENT)
Age: 43
End: 2023-10-15

## 2023-11-17 ENCOUNTER — MEDICAL CORRESPONDENCE (OUTPATIENT)
Dept: HEALTH INFORMATION MANAGEMENT | Facility: CLINIC | Age: 43
End: 2023-11-17
Payer: COMMERCIAL

## 2023-11-17 ENCOUNTER — TRANSFERRED RECORDS (OUTPATIENT)
Dept: HEALTH INFORMATION MANAGEMENT | Facility: CLINIC | Age: 43
End: 2023-11-17
Payer: COMMERCIAL

## 2023-12-19 ENCOUNTER — HOSPITAL ENCOUNTER (OUTPATIENT)
Dept: ULTRASOUND IMAGING | Facility: CLINIC | Age: 43
Discharge: HOME | End: 2023-12-19
Attending: OBSTETRICS & GYNECOLOGY | Admitting: OBSTETRICS & GYNECOLOGY
Payer: COMMERCIAL

## 2023-12-19 DIAGNOSIS — E04.2 MULTIPLE THYROID NODULES: ICD-10-CM

## 2023-12-19 PROCEDURE — 76536 US EXAM OF HEAD AND NECK: CPT

## 2024-12-08 ENCOUNTER — HEALTH MAINTENANCE LETTER (OUTPATIENT)
Age: 44
End: 2024-12-08

## 2025-09-04 ENCOUNTER — PATIENT OUTREACH (OUTPATIENT)
Dept: CARE COORDINATION | Facility: CLINIC | Age: 45
End: 2025-09-04
Payer: COMMERCIAL